# Patient Record
Sex: FEMALE | Race: WHITE | NOT HISPANIC OR LATINO
[De-identification: names, ages, dates, MRNs, and addresses within clinical notes are randomized per-mention and may not be internally consistent; named-entity substitution may affect disease eponyms.]

---

## 2024-05-01 ENCOUNTER — APPOINTMENT (OUTPATIENT)
Dept: ORTHOPEDIC SURGERY | Facility: CLINIC | Age: 34
End: 2024-05-01

## 2024-05-03 ENCOUNTER — APPOINTMENT (OUTPATIENT)
Dept: ORTHOPEDIC SURGERY | Facility: CLINIC | Age: 34
End: 2024-05-03
Payer: COMMERCIAL

## 2024-05-03 VITALS — BODY MASS INDEX: 25.1 KG/M2 | HEIGHT: 64 IN | WEIGHT: 147 LBS

## 2024-05-03 DIAGNOSIS — M25.539 PAIN IN UNSPECIFIED WRIST: ICD-10-CM

## 2024-05-03 DIAGNOSIS — M77.8 OTHER ENTHESOPATHIES, NOT ELSEWHERE CLASSIFIED: ICD-10-CM

## 2024-05-03 DIAGNOSIS — M25.572 PAIN IN LEFT ANKLE AND JOINTS OF LEFT FOOT: ICD-10-CM

## 2024-05-03 PROCEDURE — 99203 OFFICE O/P NEW LOW 30 MIN: CPT

## 2024-05-03 NOTE — DISCUSSION/SUMMARY
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  Patient is currently training for a half marathon scheduled for 5/18/2024, this is her first half marathon.  She does not recall any injury or trauma to the area but has been having gradually worsening lateral ankle and foot pain.  Based on her current running regimen and mileage it is less likely that she has a stress fracture, but patient may have subfibular impingement/sinus tarsi syndrome and some radiating pain around the lateral foot and ankle area due to repetitive running.  At this time recommend utilization of a cam walker boot for ambulatory assist device which was provided today.  If patient has reduction of pain over the next 2 weeks and she is able to do a trial of a short run prior to her upcoming half marathon she may proceed with the May half marathon as planned.  She is advised that if she is still having pain and cannot tolerate a short trial run she should withhold and refrain from participating in a half marathon as this can worsen repetitive stress injury to the lateral ankle/foot.  Patient started on a course of oral NSAIDs, prescription given for diclofenac (We discussed all possible side effects of this medication).  MRI is not clinically indicated at this time nor is x-ray as this would not change the patient's treatment options only 2 weeks prior to her upcoming half marathon.  Recommend deferral of imaging.  Follow up as needed.  Patient appreciates and agrees with current plan.  This note was generated using dragon medical dictation software.  A reasonable effort has been made for proofreading its contents, but typos may still remain.  If there are any questions or points of clarification needed please notify my office. 
Abdomen soft, non-tender, no guarding.

## 2024-05-03 NOTE — RETURN TO WORK/SCHOOL
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  He is allowed her to be working from home as she is required to be wearing a cam walker boot and cannot commute for the next 4 weeks.  This note expires on 6/3/2024. Should you have any questions please call the office at [phone] Thank you for your understanding.     Scotty Cabrera DO, ATC [facility] [place]

## 2024-05-03 NOTE — HISTORY OF PRESENT ILLNESS
[de-identified] : Patient is a 33 year-old female who presents to the office today for initial evaluation of left ankle pain. Pain has been present for the past 2 weeks. She is an avid runner. She began having pain last week. Pain is located around the lateral malleolus and radiates to the dorsum of the foot. She states the pain hurts more when she wears shoes or is ambulating. Pain is dull in nature. She has discontinued running for now. She has been taking Ibuprofen which gives temporary relief.

## 2024-05-03 NOTE — PHYSICAL EXAM
[de-identified] : Constitutional: Well-nourished, well-developed, No acute distress Respiratory:  Good respiratory effort, no SOB Lymphatic: No regional lymphadenopathy, no lymphedema Psychiatric: Pleasant and normal affect, alert and oriented x3 Musculoskeletal: normal except where as noted in regional exam  Left ankle: APPEARANCE: no swelling, no marked deformities or malalignment POSITIVE TENDERNESS: + TTP of sinus tarsi, ATFL NONTENDER: medial malleolus, lateral malleolus, tibialis posterior tendon, achilles tendon, no marked thickening of tendon, CFL, PTFL, anterior tibiofibular ligament (high ankle), deltoid ligaments, 5th metatarsal.  RANGE OF MOTION: full & painless.  RESISTIVE TESTING: Painful 4/5 inversion/eversion, painless resisted dorsiflexion, plantar flexion  SPECIAL TESTS: neg anterior drawer. neg talar tilt. neg Thao's

## 2024-09-03 ENCOUNTER — NON-APPOINTMENT (OUTPATIENT)
Age: 34
End: 2024-09-03

## 2024-12-18 ENCOUNTER — TRANSCRIPTION ENCOUNTER (OUTPATIENT)
Age: 34
End: 2024-12-18

## 2024-12-18 ENCOUNTER — APPOINTMENT (OUTPATIENT)
Dept: ORTHOPEDIC SURGERY | Facility: CLINIC | Age: 34
End: 2024-12-18
Payer: COMMERCIAL

## 2024-12-18 ENCOUNTER — NON-APPOINTMENT (OUTPATIENT)
Age: 34
End: 2024-12-18

## 2024-12-18 VITALS — BODY MASS INDEX: 23.22 KG/M2 | WEIGHT: 136 LBS | HEIGHT: 64 IN

## 2024-12-18 DIAGNOSIS — M25.562 PAIN IN LEFT KNEE: ICD-10-CM

## 2024-12-18 PROCEDURE — 99203 OFFICE O/P NEW LOW 30 MIN: CPT

## 2024-12-18 PROCEDURE — 73564 X-RAY EXAM KNEE 4 OR MORE: CPT | Mod: LT

## 2025-01-09 ENCOUNTER — APPOINTMENT (OUTPATIENT)
Dept: SPORTS MEDICINE | Facility: CLINIC | Age: 35
End: 2025-01-09
Payer: COMMERCIAL

## 2025-01-09 VITALS — HEIGHT: 64 IN | BODY MASS INDEX: 23.9 KG/M2 | WEIGHT: 140 LBS

## 2025-01-09 DIAGNOSIS — S76.211A STRAIN OF ADDUCTOR MUSCLE, FASCIA AND TENDON OF RIGHT THIGH, INITIAL ENCOUNTER: ICD-10-CM

## 2025-01-09 DIAGNOSIS — M79.651 PAIN IN RIGHT THIGH: ICD-10-CM

## 2025-01-09 PROCEDURE — 99204 OFFICE O/P NEW MOD 45 MIN: CPT

## 2025-01-09 RX ORDER — MELOXICAM 7.5 MG/1
7.5 TABLET ORAL DAILY
Qty: 30 | Refills: 1 | Status: ACTIVE | COMMUNITY
Start: 2025-01-09 | End: 1900-01-01